# Patient Record
Sex: MALE | Race: WHITE | NOT HISPANIC OR LATINO | Employment: FULL TIME | URBAN - METROPOLITAN AREA
[De-identification: names, ages, dates, MRNs, and addresses within clinical notes are randomized per-mention and may not be internally consistent; named-entity substitution may affect disease eponyms.]

---

## 2020-12-20 ENCOUNTER — NURSE TRIAGE (OUTPATIENT)
Dept: OTHER | Facility: OTHER | Age: 70
End: 2020-12-20

## 2020-12-20 ENCOUNTER — OFFICE VISIT (OUTPATIENT)
Dept: URGENT CARE | Facility: CLINIC | Age: 70
End: 2020-12-20
Payer: COMMERCIAL

## 2020-12-20 VITALS — OXYGEN SATURATION: 95 % | RESPIRATION RATE: 20 BRPM | TEMPERATURE: 97.9 F | HEART RATE: 102 BPM

## 2020-12-20 DIAGNOSIS — Z20.822 EXPOSURE TO COVID-19 VIRUS: ICD-10-CM

## 2020-12-20 DIAGNOSIS — R05.9 COUGH: Primary | ICD-10-CM

## 2020-12-20 PROCEDURE — 99213 OFFICE O/P EST LOW 20 MIN: CPT | Performed by: PHYSICIAN ASSISTANT

## 2020-12-20 PROCEDURE — S9088 SERVICES PROVIDED IN URGENT: HCPCS | Performed by: PHYSICIAN ASSISTANT

## 2020-12-20 PROCEDURE — U0003 INFECTIOUS AGENT DETECTION BY NUCLEIC ACID (DNA OR RNA); SEVERE ACUTE RESPIRATORY SYNDROME CORONAVIRUS 2 (SARS-COV-2) (CORONAVIRUS DISEASE [COVID-19]), AMPLIFIED PROBE TECHNIQUE, MAKING USE OF HIGH THROUGHPUT TECHNOLOGIES AS DESCRIBED BY CMS-2020-01-R: HCPCS | Performed by: PHYSICIAN ASSISTANT

## 2020-12-20 RX ORDER — DUTASTERIDE 0.5 MG/1
0.5 CAPSULE, LIQUID FILLED ORAL
COMMUNITY
Start: 2020-07-31

## 2020-12-20 RX ORDER — GLYBURIDE 5 MG/1
TABLET ORAL
COMMUNITY
Start: 2020-11-30

## 2020-12-20 RX ORDER — DAPAGLIFLOZIN 10 MG/1
10 TABLET, FILM COATED ORAL DAILY
COMMUNITY
Start: 2020-11-06

## 2020-12-20 RX ORDER — SITAGLIPTIN 100 MG/1
100 TABLET, FILM COATED ORAL DAILY
COMMUNITY
Start: 2020-12-14

## 2020-12-20 RX ORDER — LISINOPRIL 5 MG/1
5 TABLET ORAL
COMMUNITY
Start: 2020-12-10

## 2020-12-20 RX ORDER — METOPROLOL SUCCINATE 25 MG/1
25 TABLET, EXTENDED RELEASE ORAL
COMMUNITY
Start: 2020-07-17

## 2020-12-22 ENCOUNTER — TELEPHONE (OUTPATIENT)
Dept: URGENT CARE | Facility: CLINIC | Age: 70
End: 2020-12-22

## 2020-12-22 LAB — SARS-COV-2 RNA SPEC QL NAA+PROBE: DETECTED

## 2023-10-23 ENCOUNTER — APPOINTMENT (EMERGENCY)
Dept: RADIOLOGY | Facility: HOSPITAL | Age: 73
End: 2023-10-23
Payer: COMMERCIAL

## 2023-10-23 ENCOUNTER — HOSPITAL ENCOUNTER (EMERGENCY)
Facility: HOSPITAL | Age: 73
Discharge: HOME | End: 2023-10-23
Attending: EMERGENCY MEDICINE | Admitting: EMERGENCY MEDICINE
Payer: COMMERCIAL

## 2023-10-23 VITALS
HEART RATE: 100 BPM | DIASTOLIC BLOOD PRESSURE: 88 MMHG | RESPIRATION RATE: 16 BRPM | WEIGHT: 220 LBS | SYSTOLIC BLOOD PRESSURE: 141 MMHG | BODY MASS INDEX: 31.5 KG/M2 | TEMPERATURE: 99 F | HEIGHT: 70 IN | OXYGEN SATURATION: 97 %

## 2023-10-23 DIAGNOSIS — M25.562 LEFT KNEE PAIN: Primary | ICD-10-CM

## 2023-10-23 PROCEDURE — 73564 X-RAY EXAM KNEE 4 OR MORE: CPT

## 2023-10-23 PROCEDURE — 99283 EMERGENCY DEPT VISIT LOW MDM: CPT

## 2023-10-23 PROCEDURE — 99284 EMERGENCY DEPT VISIT MOD MDM: CPT | Performed by: PHYSICIAN ASSISTANT

## 2023-10-23 PROCEDURE — 96372 THER/PROPH/DIAG INJ SC/IM: CPT

## 2023-10-23 RX ORDER — KETOROLAC TROMETHAMINE 30 MG/ML
15 INJECTION, SOLUTION INTRAMUSCULAR; INTRAVENOUS ONCE
Status: COMPLETED | OUTPATIENT
Start: 2023-10-23 | End: 2023-10-23

## 2023-10-23 RX ADMIN — KETOROLAC TROMETHAMINE 15 MG: 30 INJECTION, SOLUTION INTRAMUSCULAR; INTRAVENOUS at 15:10

## 2023-10-23 NOTE — ED PROVIDER NOTES
History  Chief Complaint   Patient presents with    Knee Pain     Pt. Voiced he has had left knee pain that is a chronic problem and usually gets injections from bone doctor for the pain     27-year-old male presented to the emergency department for evaluation of acute on chronic left knee pain. Patient reports his left knee chronically gives some issues reports he has had multiple injections in the knee also has had fluid drained off the knee several times. States yesterday while carrying groceries he stepped wrong. States his foot stopped but his knee kept going. Reports since he has had pain in the knee. States he is now having difficulty bearing weight. Denies any redness or swelling. Denies fevers or chills. Prior to Admission Medications   Prescriptions Last Dose Informant Patient Reported? Taking? Farxiga 10 MG TABS   Yes No   Sig: Take 10 mg by mouth daily   Januvia 100 MG tablet   Yes No   Sig: Take 100 mg by mouth daily   dutasteride (AVODART) 0.5 mg capsule   Yes No   Sig: Take 0.5 mg by mouth   glyBURIDE (DIABETA) 5 mg tablet   Yes No   Sig: take 2 tablets by mouth twice a day WITH MORNING AND EVENING MEALS   lisinopril (ZESTRIL) 5 mg tablet   Yes No   Sig: Take 5 mg by mouth   metFORMIN (GLUCOPHAGE) 1000 MG tablet   Yes No   Sig: Take 1,000 mg by mouth   metoprolol succinate (TOPROL-XL) 25 mg 24 hr tablet   Yes No   Sig: Take 25 mg by mouth      Facility-Administered Medications: None       Past Medical History:   Diagnosis Date    Diabetes mellitus ([address])        Past Surgical History:   Procedure Laterality Date    TONSILLECTOMY         History reviewed. No pertinent family history. I have reviewed and agree with the history as documented.     E-Cigarette/Vaping    E-Cigarette Use Never User      E-Cigarette/Vaping Substances     Social History     Tobacco Use    Smoking status: Never    Smokeless tobacco: Never   Vaping Use    Vaping Use: Never used   Substance Use Topics    Alcohol use: Not Currently    Drug use: Never       Review of Systems   Constitutional: Negative. Respiratory: Negative. Cardiovascular: Negative. Gastrointestinal: Negative. Musculoskeletal:  Positive for arthralgias. Negative for joint swelling. Skin: Negative. All other systems reviewed and are negative. Physical Exam  Physical Exam  Vitals and nursing note reviewed. Constitutional:       General: He is not in acute distress. Appearance: Normal appearance. He is not ill-appearing, toxic-appearing or diaphoretic. HENT:      Head: Normocephalic. Eyes:      Conjunctiva/sclera: Conjunctivae normal.   Pulmonary:      Effort: Pulmonary effort is normal.   Musculoskeletal:         General: Tenderness (Medial left knee tenderness) present. No swelling. Normal range of motion. Left knee: Bony tenderness present. No swelling, deformity, erythema or ecchymosis. Tenderness present over the medial joint line. No LCL laxity or MCL laxity. Skin:     General: Skin is warm and dry. Findings: No bruising or erythema. Neurological:      General: No focal deficit present. Mental Status: He is alert.    Psychiatric:         Mood and Affect: Mood normal.         Vital Signs  ED Triage Vitals [10/23/23 1343]   Temperature Pulse Respirations Blood Pressure SpO2   99 °F (37.2 °C) 100 16 141/88 97 %      Temp Source Heart Rate Source Patient Position - Orthostatic VS BP Location FiO2 (%)   Oral Monitor Sitting Left arm --      Pain Score       10 - Worst Possible Pain           Vitals:    10/23/23 1343   BP: 141/88   Pulse: 100   Patient Position - Orthostatic VS: Sitting         Visual Acuity      ED Medications  Medications   ketorolac (TORADOL) injection 15 mg (15 mg Intramuscular Given 10/23/23 1510)       Diagnostic Studies  Results Reviewed       None                   XR knee 4+ vw left injury    (Results Pending)              Procedures  Procedures         ED Course SBIRT 22yo+      Flowsheet Row Most Recent Value   Initial Alcohol Screen: US AUDIT-C     1. How often do you have a drink containing alcohol? 0 Filed at: 10/23/2023 1436   2. How many drinks containing alcohol do you have on a typical day you are drinking? 0 Filed at: 10/23/2023 1436   3b. FEMALE Any Age, or MALE 65+: How often do you have 4 or more drinks on one occassion? 0 Filed at: 10/23/2023 1436   Audit-C Score 0 Filed at: 10/23/2023 1436   YAMILA: How many times in the past year have you. .. Used an illegal drug or used a prescription medication for non-medical reasons? Never Filed at: 10/23/2023 1436                      Medical Decision Making  80-year-old male presents the emergency department for evaluation of acute on chronic left knee pain. Vitals and medical.  Patient risk for the following but not limited to fracture, contusion, sprain/strain with septic joint. There is no redness or warmth, no swelling, low concern for septic joint. No effusion. ED interpretation of x-ray was negative for acute osseous injury. Will treat as knee sprain. Was provided with Ace wrap and pain. We will follow-up with his orthopedic specialist.  We will discussed return precautions and follow-up and patient verbalized understanding. He was clinically and hemodynamically stable for discharge    Amount and/or Complexity of Data Reviewed  Radiology: ordered. Risk  Prescription drug management. Disposition  Final diagnoses:   Left knee pain     Time reflects when diagnosis was documented in both MDM as applicable and the Disposition within this note       Time User Action Codes Description Comment    10/23/2023  2:57 PM Bar Mathis Add [G69.285] Left knee pain           ED Disposition       ED Disposition   Discharge    Condition   Stable    Date/Time   Mon Oct 23, 2023 [address] discharge to home/self care.                    Follow-up Information Follow up With Specialties Details Why [address], [place] Family Medicine   [address]  [address] [place] [address]  [address] Orthopedic Surgery   [address]  [address]  [address] [facility] [place] [address] [phone]              Discharge Medication List as of 10/23/2023  2:58 PM        CONTINUE these medications which have NOT CHANGED    Details   dutasteride (AVODART) 0.5 mg capsule Take 0.5 mg by mouth, Starting Fri 7/31/2020, Historical Med      Farxiga 10 MG TABS Take 10 mg by mouth daily, Starting Fri 11/6/2020, Historical Med      glyBURIDE (DIABETA) 5 mg tablet take 2 tablets by mouth twice a day WITH MORNING AND EVENING MEALS, Historical Med      Januvia 100 MG tablet Take 100 mg by mouth daily, Starting Mon 12/14/2020, Historical Med      lisinopril (ZESTRIL) 5 mg tablet Take 5 mg by mouth, Starting Thu 12/10/2020, Historical Med      metFORMIN (GLUCOPHAGE) 1000 MG tablet Take 1,000 mg by mouth, Starting Fri 7/31/2020, Historical Med      metoprolol succinate (TOPROL-XL) 25 mg 24 hr tablet Take 25 mg by mouth, Starting Fri 7/17/2020, Historical Med                 PDMP Review       None            ED Provider  Electronically Signed by             Racquel Santos PA-C  10/23/23 3281

## 2023-10-23 NOTE — DISCHARGE INSTRUCTIONS
Alternate between Tylenol and ibuprofen. Use Ace wrap. Rest and ice. Please use walking aid and follow-up with orthopedics.   Please return with any new or worsening symptoms

## 2023-10-25 ENCOUNTER — OFFICE VISIT (OUTPATIENT)
Dept: OBGYN CLINIC | Facility: CLINIC | Age: 73
End: 2023-10-25
Payer: COMMERCIAL

## 2023-10-25 VITALS
SYSTOLIC BLOOD PRESSURE: 120 MMHG | DIASTOLIC BLOOD PRESSURE: 80 MMHG | WEIGHT: 226 LBS | TEMPERATURE: 97.8 F | BODY MASS INDEX: 32.35 KG/M2 | HEIGHT: 70 IN | HEART RATE: 106 BPM

## 2023-10-25 DIAGNOSIS — M25.462 EFFUSION OF LEFT KNEE: ICD-10-CM

## 2023-10-25 DIAGNOSIS — M25.562 LEFT KNEE PAIN: ICD-10-CM

## 2023-10-25 DIAGNOSIS — M17.12 PRIMARY OSTEOARTHRITIS OF LEFT KNEE: Primary | ICD-10-CM

## 2023-10-25 PROCEDURE — 20610 DRAIN/INJ JOINT/BURSA W/O US: CPT | Performed by: ORTHOPAEDIC SURGERY

## 2023-10-25 PROCEDURE — 99204 OFFICE O/P NEW MOD 45 MIN: CPT | Performed by: ORTHOPAEDIC SURGERY

## 2023-10-25 RX ORDER — TRIAMCINOLONE ACETONIDE 40 MG/ML
40 INJECTION, SUSPENSION INTRA-ARTICULAR; INTRAMUSCULAR
Status: COMPLETED | OUTPATIENT
Start: 2023-10-25 | End: 2023-10-25

## 2023-10-25 RX ORDER — BUPIVACAINE HYDROCHLORIDE 2.5 MG/ML
4 INJECTION, SOLUTION INFILTRATION; PERINEURAL
Status: COMPLETED | OUTPATIENT
Start: 2023-10-25 | End: 2023-10-25

## 2023-10-25 RX ADMIN — BUPIVACAINE HYDROCHLORIDE 4 ML: 2.5 INJECTION, SOLUTION INFILTRATION; PERINEURAL at 15:30

## 2023-10-25 RX ADMIN — TRIAMCINOLONE ACETONIDE 40 MG: 40 INJECTION, SUSPENSION INTRA-ARTICULAR; INTRAMUSCULAR at 15:30

## 2023-10-25 NOTE — PROGRESS NOTES
ASSESSMENT/PLAN:    Diagnoses and all orders for this visit:    Primary osteoarthritis of left knee    Left knee pain  -     Ambulatory Referral to Orthopedic Surgery    Effusion of left knee        Plan: Treatment was discussed. He elected to proceed with aspiration and injection which was performed and tolerated well. A compressive dressing was applied. I will see him in 2 to 3 weeks for reevaluation. He did not want to attend physical therapy. A note was provided for him to return to work on 10/30/2023. I have encouraged him to contact me if questions or concerns arise. Return for 2 to 3 weeks. _____________________________________________________  CHIEF COMPLAINT:  Chief Complaint   Patient presents with    Left Knee - Pain         SUBJECTIVE:  Servando Harman is a 68y.o. year old male who presents for evaluation of his left knee. He states he has had chronic left knee pain but symptoms were exacerbated on 10/22/2023. He states he was walking forward, his foot slipped and he flexed the knee, feeling immediate onset of some knee pain but denies actually falling. Pain has persisted over the past few days although there is minimal improvement. He notes swelling which was not present prior to the episode. He was seen a couple months ago by another physician and underwent viscosupplement injection which he states provided excellent relief up until this episode. He denies instability. He presents now for orthopedic evaluation and treatment after having been seen in the emergency room at [facility] on 10/23/2023. X-rays were obtained but no specific treatment was rendered. PAST MEDICAL HISTORY:  Past Medical History:   Diagnosis Date    Diabetes mellitus ([address])        PAST SURGICAL HISTORY:  Past Surgical History:   Procedure Laterality Date    TONSILLECTOMY         FAMILY HISTORY:  History reviewed. No pertinent family history.     SOCIAL HISTORY:  Social History     Tobacco Use Smoking status: Never    Smokeless tobacco: Never   Vaping Use    Vaping Use: Never used   Substance Use Topics    Alcohol use: Not Currently    Drug use: Never       MEDICATIONS:    Current Outpatient Medications:     dutasteride (AVODART) 0.5 mg capsule, Take 0.5 mg by mouth, Disp: , Rfl:     Farxiga 10 MG TABS, Take 10 mg by mouth daily, Disp: , Rfl:     glyBURIDE (DIABETA) 5 mg tablet, take 2 tablets by mouth twice a day WITH MORNING AND EVENING MEALS, Disp: , Rfl:     Januvia 100 MG tablet, Take 100 mg by mouth daily, Disp: , Rfl:     lisinopril (ZESTRIL) 5 mg tablet, Take 5 mg by mouth, Disp: , Rfl:     metFORMIN (GLUCOPHAGE) 1000 MG tablet, Take 1,000 mg by mouth, Disp: , Rfl:     metoprolol succinate (TOPROL-XL) 25 mg 24 hr tablet, Take 25 mg by mouth, Disp: , Rfl:     ALLERGIES:  No Known Allergies    Review of systems:   Constitutional: Negative for fatigue, fever or loss of apetite. HENT: Negative. Respiratory: Negative for shortness of breath, dyspnea. Cardiovascular: Negative for chest pain/tightness. Gastrointestinal: Negative for abdominal pain, N/V. Endocrine: Negative for cold/heat intolerance, unexplained weight loss/gain. Genitourinary: Negative for flank pain, dysuria, hematuria. Musculoskeletal: Positive as in HPI  Skin: Negative for rash. Neurological: Negative  Psychiatric/Behavioral: Negative for agitation. _____________________________________________________  PHYSICAL EXAMINATION:    Blood pressure 120/80, pulse (!) 106, temperature 97.8 °F (36.6 °C), temperature source Temporal, height 5' 10" (1.778 m), weight 103 kg (226 lb). General: well developed and well nourished, alert, oriented times 3, and appears comfortable  Psychiatric: Normal  HEENT: Benign  Cardiovascular: Regular    Pulmonary: No wheezing or stridor  Abdomen: Soft, Nontender  Skin: No masses, erythema, lacerations, fluctation, ulcerations  Neurovascular: Motor and sensory exams are grossly intact. Pulses palpable. MUSCULOSKELETAL EXAMINATION:    The left knee exam demonstrates the skin to be warm and dry. There is no significant warmth. There is no redness. He lacks about 10 to 15 degrees of full extension and can flex to 90 degrees complaining of pain with endrange flexion. Ligaments are grossly stable. He denies any tenderness to palpation of the medial, lateral or anterior knee. He has no posterior tenderness. The remainder of the left lower extremity examination is grossly benign. _____________________________________________________  STUDIES REVIEWED:  Nonweightbearing x-rays of his left knee obtained in the emergency room on 10/23/2023 demonstrate narrowing of the medial joint space, subchondral sclerosis and small osteophytes. An effusion is noted. The report was reviewed. The ER note was reviewed. PROCEDURES:  Large joint arthrocentesis: L knee  Universal Protocol:  Consent: Verbal consent obtained.   Consent given by: patient  Patient understanding: patient states understanding of the procedure being performed  Supporting Documentation  Indications: pain and joint swelling   Procedure Details  Location: knee - L knee  Needle size: 18 G  Ultrasound guidance: no  Approach: lateral  Medications administered: 4 mL bupivacaine 0.25 %; 40 mg triamcinolone acetonide 40 mg/mL    Aspirate amount: 25 mL  Aspirate: yellow and blood-tinged            Coleman Morales

## 2023-10-25 NOTE — LETTER
October 25, 2023     Patient: Servando Harman  YOB: 1950  Date of Visit: 10/25/2023      To Whom it May Concern:    Lavern Arceo is under my professional care. Krystal Cool was seen in my office on 10/25/2023. Krystal Cool may return to work on 10/30/2023. He may return to work without restriction . If you have any questions or concerns, please don't hesitate to call. Sincerely,          Marina Fitch        CC: Esthela Najera.  Warren Randolph

## 2023-11-10 ENCOUNTER — OFFICE VISIT (OUTPATIENT)
Dept: OBGYN CLINIC | Facility: CLINIC | Age: 73
End: 2023-11-10
Payer: COMMERCIAL

## 2023-11-10 VITALS
HEIGHT: 70 IN | DIASTOLIC BLOOD PRESSURE: 78 MMHG | TEMPERATURE: 97.7 F | HEART RATE: 103 BPM | SYSTOLIC BLOOD PRESSURE: 126 MMHG | WEIGHT: 228 LBS | BODY MASS INDEX: 32.64 KG/M2

## 2023-11-10 DIAGNOSIS — M17.12 PRIMARY OSTEOARTHRITIS OF LEFT KNEE: Primary | ICD-10-CM

## 2023-11-10 PROCEDURE — 99213 OFFICE O/P EST LOW 20 MIN: CPT | Performed by: ORTHOPAEDIC SURGERY

## 2023-11-10 NOTE — PATIENT INSTRUCTIONS
Patient like to be evaluated in February for possible Visco injection if deemed warranted. Tentatively scheduled for early to mid February ointment time. He is to continue maintaining some activity he may use ice and over-the-counter pain medicine. Attempts weight loss. Patient is going to have aortic valve replacement in the near future.

## 2023-11-10 NOTE — PROGRESS NOTES
ASSESSMENT/PLAN:    Diagnoses and all orders for this visit:    Primary osteoarthritis of left knee    Patient like to be evaluated in February for possible Visco injection if deemed warranted. Tentatively scheduled for early to mid February ointment time. He is to continue maintaining some activity he may use ice and over-the-counter pain medicine. Attempts weight loss. Patient is going to have aortic valve replacement in the near future. Return About 3 months, for Recheck.  _____________________________________________________  CHIEF COMPLAINT:  Chief Complaint   Patient presents with    Follow-up     SUBJECTIVE:  Pola Grimm is a 68y.o. year old male who presents for follow up of his left knee pain and arthritis. He had previous effusion and underwent aspiration and injection with disown 10/25/2023, 2 weeks ago. Reports he is approximately 90% improved. He does note that he had 3 injection hyaluronic acid done in August at another provider. Patient continues to work. He not want to do formal therapy as he has weights at home. He notes that he has pain when laying in bed on the medial side with his leg straight is only main culprit of discomfort at current. He continues to work. PAST MEDICAL HISTORY:  Past Medical History:   Diagnosis Date    Diabetes mellitus ([address])      PAST SURGICAL HISTORY:  Past Surgical History:   Procedure Laterality Date    TONSILLECTOMY       FAMILY HISTORY:  History reviewed. No pertinent family history.     SOCIAL HISTORY:  Social History     Tobacco Use    Smoking status: Never    Smokeless tobacco: Never   Vaping Use    Vaping Use: Never used   Substance Use Topics    Alcohol use: Not Currently    Drug use: Never     MEDICATIONS:    Current Outpatient Medications:     dutasteride (AVODART) 0.5 mg capsule, Take 0.5 mg by mouth, Disp: , Rfl:     Farxiga 10 MG TABS, Take 10 mg by mouth daily, Disp: , Rfl:     glyBURIDE (DIABETA) 5 mg tablet, take 2 tablets by mouth twice a day WITH MORNING AND EVENING MEALS, Disp: , Rfl:     Januvia 100 MG tablet, Take 100 mg by mouth daily, Disp: , Rfl:     lisinopril (ZESTRIL) 5 mg tablet, Take 5 mg by mouth, Disp: , Rfl:     metFORMIN (GLUCOPHAGE) 1000 MG tablet, Take 1,000 mg by mouth, Disp: , Rfl:     metoprolol succinate (TOPROL-XL) 25 mg 24 hr tablet, Take 25 mg by mouth, Disp: , Rfl:     ALLERGIES:  No Known Allergies    REVIEW OF SYSTEMS:  Pertinent items are noted in HPI. A comprehensive review of systems was negative.  _____________________________________________________  PHYSICAL EXAMINATION:  General: well developed and well nourished, alert, oriented times 3, and appears comfortable  Psychiatric: Normal  HEENT:  Normocephalic, atraumatic  Cardiovascular:  Regular  Pulmonary: No wheezing or stridor  Skin: No masses, erthema, lacerations, fluctation, ulcerations  Neurovascular: Grossly intact  MUSCULOSKELETAL EXAMINATION:    Left knee is without gross effusion erythema or warmth. His primary tenderness still continues on the medial femoral condyle. No lateral pain or patellofemoral type pain. He is able to get full extension without pain. There is no pain with varus or valgus manipulation of the knee.   Walks with a nonantalgic gait.  _____________________________________________________  STUDIES REVIEWED:  No Studies to review    PROCEDURES PERFORMED:  None today    Sara Kapadia PA-C

## 2024-01-02 ENCOUNTER — TELEPHONE (OUTPATIENT)
Dept: OBGYN CLINIC | Facility: MEDICAL CENTER | Age: 74
End: 2024-01-02

## 2024-01-02 ENCOUNTER — TELEPHONE (OUTPATIENT)
Age: 74
End: 2024-01-02

## 2024-01-02 NOTE — TELEPHONE ENCOUNTER
Caller: Patient Spouse    Doctor: Javier    Reason for call: Patient spouse calling back to check on force-on request.  Reports patient is in extreme pain and she is anxious to get him scheduled ASAP.    Call back#: [phone]

## 2024-01-02 NOTE — TELEPHONE ENCOUNTER
Hello,  Please advise if the following patient can be forced onto the schedule:    Patient: Wade    :50    MRN: [id number]     Call back #: [phone] please call pts wife    Insurance: [facility]     Reason for appointment: Pt is in severe pain. His left knee is filled with fluid. Is is possible for The patient to be forced on the schedule for 1/3 with Javier. I was able to get him on  but his wife is extremely concerned, the patient screams out when he movies it.  Could this appointment please be as early in the am as possible due to him having a cardio appt in the afternoon. Thank you.    Requested  Dr Herrera /Merry       Thank you.

## 2024-01-03 ENCOUNTER — HOSPITAL ENCOUNTER (OUTPATIENT)
Dept: RADIOLOGY | Facility: CLINIC | Age: 74
Discharge: HOME | End: 2024-01-03
Payer: COMMERCIAL

## 2024-01-03 ENCOUNTER — OFFICE VISIT (OUTPATIENT)
Dept: OBGYN CLINIC | Facility: CLINIC | Age: 74
End: 2024-01-03
Payer: COMMERCIAL

## 2024-01-03 VITALS
SYSTOLIC BLOOD PRESSURE: 130 MMHG | HEART RATE: 94 BPM | WEIGHT: 228.6 LBS | TEMPERATURE: 97.7 F | DIASTOLIC BLOOD PRESSURE: 80 MMHG | HEIGHT: 70 IN | BODY MASS INDEX: 32.73 KG/M2

## 2024-01-03 DIAGNOSIS — G89.29 CHRONIC PAIN OF LEFT KNEE: ICD-10-CM

## 2024-01-03 DIAGNOSIS — M25.462 EFFUSION OF LEFT KNEE: Primary | ICD-10-CM

## 2024-01-03 DIAGNOSIS — M25.462 EFFUSION OF LEFT KNEE: ICD-10-CM

## 2024-01-03 DIAGNOSIS — M17.12 PRIMARY OSTEOARTHRITIS OF LEFT KNEE: ICD-10-CM

## 2024-01-03 DIAGNOSIS — M25.562 CHRONIC PAIN OF LEFT KNEE: ICD-10-CM

## 2024-01-03 PROCEDURE — 20610 DRAIN/INJ JOINT/BURSA W/O US: CPT | Performed by: ORTHOPAEDIC SURGERY

## 2024-01-03 PROCEDURE — 99214 OFFICE O/P EST MOD 30 MIN: CPT | Performed by: ORTHOPAEDIC SURGERY

## 2024-01-03 PROCEDURE — 73564 X-RAY EXAM KNEE 4 OR MORE: CPT

## 2024-01-03 NOTE — PROGRESS NOTES
ASSESSMENT/PLAN:    Diagnoses and all orders for this visit:    Effusion of left knee  -     XR knee 4+ vw left injury; Future    Primary osteoarthritis of left knee  -     XR knee 4+ vw left injury; Future    Chronic pain of left knee        Plan: Treatment was discussed.  He elected to proceed with attempted aspiration.  Unfortunately, no fluid was returned although he does admit that the knee felt better afterwards.  He did not want to pursue corticosteroid injection after a discussion of the risks and the lack of improvement in his symptoms for a reasonable period of time after injection was done just over 2 months ago.  He is scheduled to see me in February at which point in time we will attempt to obtain approval for viscosupplement injection after he has been seen.  He underwent viscosupplementation at a different orthopedic office in August 2023.  He noted significant reduction in symptoms for approximately 2 months.  He did not express any interest in pursuing knee replacement arthroplasty indicating that he had 1 more year until residential.    Return for As scheduled.      _____________________________________________________  CHIEF COMPLAINT:  Chief Complaint   Patient presents with   • Follow-up         SUBJECTIVE:  Wade Rooney is a 73 y.o. year old male who presents for follow up of his left knee.  He was last seen in November and was doing well status post corticosteroid injection which was done in late October.  He did well up until about a week ago when he began developing recurrent swelling and pain in his left knee.  He believes symptoms are worsening over the past week.  He notes difficulty sitting.  He states that ambulation does not seem to worsen his pain.  He denies anything that he can recall that may have triggered the change in symptoms.  He has used ice without benefit.  He presents now for orthopedic evaluation and treatment.      PAST MEDICAL HISTORY:  Past Medical History:    Diagnosis Date   • Diabetes mellitus (HCC)        PAST SURGICAL HISTORY:  Past Surgical History:   Procedure Laterality Date   • TONSILLECTOMY         FAMILY HISTORY:  History reviewed. No pertinent family history.    SOCIAL HISTORY:  Social History     Tobacco Use   • Smoking status: Never   • Smokeless tobacco: Never   Vaping Use   • Vaping status: Never Used   Substance Use Topics   • Alcohol use: Not Currently   • Drug use: Never       MEDICATIONS:    Current Outpatient Medications:   •  dutasteride (AVODART) 0.5 mg capsule, Take 0.5 mg by mouth, Disp: , Rfl:   •  Farxiga 10 MG TABS, Take 10 mg by mouth daily, Disp: , Rfl:   •  glyBURIDE (DIABETA) 5 mg tablet, take 2 tablets by mouth twice a day WITH MORNING AND EVENING MEALS, Disp: , Rfl:   •  Januvia 100 MG tablet, Take 100 mg by mouth daily, Disp: , Rfl:   •  lisinopril (ZESTRIL) 5 mg tablet, Take 5 mg by mouth, Disp: , Rfl:   •  metFORMIN (GLUCOPHAGE) 1000 MG tablet, Take 1,000 mg by mouth, Disp: , Rfl:   •  metoprolol succinate (TOPROL-XL) 25 mg 24 hr tablet, Take 25 mg by mouth, Disp: , Rfl:     ALLERGIES:  No Known Allergies    REVIEW OF SYSTEMS:  Pertinent items are noted in HPI.  A comprehensive review of systems was negative.      _____________________________________________________  PHYSICAL EXAMINATION:  General: well developed and well nourished, alert, and appears comfortable  Psychiatric: Normal  HEENT:  Normocephalic, atraumatic  Cardiovascular:  Regular  Pulmonary: No wheezing or stridor  Skin: No masses, erthema, lacerations, fluctation, ulcerations  Neurovascular: Motor and sensory exams are intact and pulses palpable.    MUSCULOSKELETAL EXAMINATION:    Left knee exam demonstrates a moderate effusion.  There is no erythema or rubor.  No lacerations or abrasions are noted.  He lacks approximately 10 degrees of full extension and has flexion to no more than 90 degrees complaining of pain with endrange flexion and extension.  Mild varus  deformity is noted.  Varus can be corrected toward neutral with valgus stress without complaints.  He has no tenderness anteriorly, medially or laterally.  The remainder of the lower extremity examination bilaterally is benign.    _____________________________________________________  STUDIES REVIEWED:  X-rays of his knee obtained today including weightbearing images demonstrate narrowing of the medial joint space with nearly complete loss of joint space on the flexion image.  There is mild lateral translation of the patella on the sunrise image.  There is no significant evidence of osteophytes or significant subchondral sclerosis appreciated.      PROCEDURES PERFORMED:  Large joint arthrocentesis: L knee  Universal Protocol:  Consent: Verbal consent obtained.  Consent given by: patient  Patient understanding: patient states understanding of the procedure being performed  Supporting Documentation  Indications: pain and joint swelling   Procedure Details  Location: knee - L knee  Needle size: 18 G  Ultrasound guidance: no  Approach: lateral    Aspirate amount: 0 mL              Dwain Herrera

## 2024-02-02 ENCOUNTER — TELEPHONE (OUTPATIENT)
Age: 74
End: 2024-02-02

## 2024-02-02 NOTE — TELEPHONE ENCOUNTER
Caller: patients wife    Doctor: Javier    Reason for call: Received a missed call, no message left or no documentation in chart     Call back#: [phone]

## 2024-02-23 ENCOUNTER — OFFICE VISIT (OUTPATIENT)
Dept: OBGYN CLINIC | Facility: CLINIC | Age: 74
End: 2024-02-23
Payer: COMMERCIAL

## 2024-02-23 VITALS
BODY MASS INDEX: 33.21 KG/M2 | SYSTOLIC BLOOD PRESSURE: 150 MMHG | DIASTOLIC BLOOD PRESSURE: 80 MMHG | HEART RATE: 112 BPM | TEMPERATURE: 97.6 F | WEIGHT: 232 LBS | HEIGHT: 70 IN

## 2024-02-23 DIAGNOSIS — M17.12 PRIMARY OSTEOARTHRITIS OF LEFT KNEE: Primary | ICD-10-CM

## 2024-02-23 DIAGNOSIS — E11.9 TYPE 2 DIABETES MELLITUS WITH HEMOGLOBIN A1C GOAL OF LESS THAN 7.0% (HCC): ICD-10-CM

## 2024-02-23 PROCEDURE — 99213 OFFICE O/P EST LOW 20 MIN: CPT | Performed by: ORTHOPAEDIC SURGERY

## 2024-02-23 NOTE — PATIENT INSTRUCTIONS
Patient would like to proceed with viscosupplement injection again.  He prefers a single injection medication therefore Durolane was ordered.  He would prefer a Wednesday later day appointment due to his work schedule.  Use Tylenol and ice for discomfort until the injection is available.  Weightbearing as tolerated.

## 2024-02-23 NOTE — PROGRESS NOTES
ASSESSMENT/PLAN:    Diagnoses and all orders for this visit:    Primary osteoarthritis of left knee  -     Injection Procedure Prior Authorization; Future    Type 2 diabetes mellitus with hemoglobin A1c goal of less than 7.0% (HCC)    Patient would like to proceed with viscosupplement injection again.  He prefers a single injection medication therefore Durolane was ordered.  He would prefer a Wednesday later day appointment due to his work schedule.  Use Tylenol and ice for discomfort until the injection is available.  Weightbearing as tolerated.    Return When Visco injection approved.  ____________________________________________________  CHIEF COMPLAINT:  Chief Complaint   Patient presents with    Follow-up     SUBJECTIVE:  Wade Rooney is a 73 y.o. year old male who presents for follow up of left knee pain and arthritis.  He notes that his worst his pain rating is an 8/10.  When he is at rest sitting he often has no pain.  He notes his pain is worsened with ambulation or going stairs.  He also reports that he cannot lead with his left leg when doing stairs.      PAST MEDICAL HISTORY:  Past Medical History:   Diagnosis Date    Diabetes mellitus (HCC)      PAST SURGICAL HISTORY:  Past Surgical History:   Procedure Laterality Date    TONSILLECTOMY       FAMILY HISTORY:  History reviewed. No pertinent family history.    SOCIAL HISTORY:  Social History     Tobacco Use    Smoking status: Never    Smokeless tobacco: Never   Vaping Use    Vaping status: Never Used   Substance Use Topics    Alcohol use: Not Currently    Drug use: Never     MEDICATIONS:    Current Outpatient Medications:     dutasteride (AVODART) 0.5 mg capsule, Take 0.5 mg by mouth, Disp: , Rfl:     Farxiga 10 MG TABS, Take 10 mg by mouth daily, Disp: , Rfl:     glyBURIDE (DIABETA) 5 mg tablet, take 2 tablets by mouth twice a day WITH MORNING AND EVENING MEALS, Disp: , Rfl:     Januvia 100 MG tablet, Take 100 mg by mouth daily, Disp: , Rfl:      lisinopril (ZESTRIL) 5 mg tablet, Take 5 mg by mouth, Disp: , Rfl:     metFORMIN (GLUCOPHAGE) 1000 MG tablet, Take 1,000 mg by mouth, Disp: , Rfl:     metoprolol succinate (TOPROL-XL) 25 mg 24 hr tablet, Take 25 mg by mouth, Disp: , Rfl:     ALLERGIES:  No Known Allergies    REVIEW OF SYSTEMS:  Pertinent items are noted in HPI.  A comprehensive review of systems was negative.  _____________________________________________________  PHYSICAL EXAMINATION:  General: well developed and well nourished, alert, and oriented times 3  Psychiatric: Normal  HEENT:  Normocephalic, atraumatic  Cardiovascular:  Regular  Pulmonary: No wheezing or stridor  Skin: No masses, erthema, lacerations, fluctation, ulcerations  Neurovascular: Grossly intact  MUSCULOSKELETAL EXAMINATION:    Left knee trace effusion.  Range of motion 5 to 95 degrees.  No gross ligamentous laxity.  Pain over the medial femoral condyle and medial tibial plateau region.  Slight patellofemoral crepitation.  _____________________________________________________  STUDIES REVIEWED:  None today    PROCEDURES PERFORMED:  None today    Eloy Garcia PA-C

## 2024-02-26 ENCOUNTER — TELEPHONE (OUTPATIENT)
Dept: OBGYN CLINIC | Facility: HOSPITAL | Age: 74
End: 2024-02-26

## 2024-02-26 NOTE — TELEPHONE ENCOUNTER
Caller: Lourdes (Wife)    Doctor: Javier    Reason for call: Wife called concerning visco injections, she was not understanding. Patient was told to call to get an appointment. I explained the process. I advised our team would call them once approved.    She is asking if this can be expedited due to the pain he is in?    Call back#: [phone]

## 2024-02-28 ENCOUNTER — PROCEDURE VISIT (OUTPATIENT)
Dept: OBGYN CLINIC | Facility: CLINIC | Age: 74
End: 2024-02-28
Payer: COMMERCIAL

## 2024-02-28 VITALS
DIASTOLIC BLOOD PRESSURE: 80 MMHG | HEART RATE: 79 BPM | WEIGHT: 232 LBS | TEMPERATURE: 97.7 F | BODY MASS INDEX: 33.21 KG/M2 | SYSTOLIC BLOOD PRESSURE: 132 MMHG | HEIGHT: 70 IN

## 2024-02-28 DIAGNOSIS — M25.562 CHRONIC PAIN OF LEFT KNEE: ICD-10-CM

## 2024-02-28 DIAGNOSIS — M17.12 PRIMARY OSTEOARTHRITIS OF LEFT KNEE: Primary | ICD-10-CM

## 2024-02-28 DIAGNOSIS — G89.29 CHRONIC PAIN OF LEFT KNEE: ICD-10-CM

## 2024-02-28 PROCEDURE — 20610 DRAIN/INJ JOINT/BURSA W/O US: CPT | Performed by: ORTHOPAEDIC SURGERY

## 2024-02-28 NOTE — PROGRESS NOTES
ASSESSMENT/PLAN:    Diagnoses and all orders for this visit:    Primary osteoarthritis of left knee    Chronic pain of left knee    Plan: Under aseptic technique, the left knee was injected with Monovisc.  He tolerated this well.  I will see him back as needed.  I did offer to see him in 4 to 6 weeks for reevaluation but he prefers to return only if the injection does not work or when symptoms were to return.  He does understand that injection cannot be repeated any sooner than 6 months from now.    Return if symptoms worsen or fail to improve.      _____________________________________________________  CHIEF COMPLAINT:  Chief Complaint   Patient presents with    Follow-up         SUBJECTIVE:  Wade Rooney is a 73 y.o. year old male who presents for viscosupplementation.  He continues to complain of left knee pain and wishes to proceed with injection as planned.    PAST MEDICAL HISTORY:  Past Medical History:   Diagnosis Date    Diabetes mellitus (HCC)        PAST SURGICAL HISTORY:  Past Surgical History:   Procedure Laterality Date    TONSILLECTOMY         FAMILY HISTORY:  History reviewed. No pertinent family history.    SOCIAL HISTORY:  Social History     Tobacco Use    Smoking status: Never    Smokeless tobacco: Never   Vaping Use    Vaping status: Never Used   Substance Use Topics    Alcohol use: Not Currently    Drug use: Never       MEDICATIONS:    Current Outpatient Medications:     dutasteride (AVODART) 0.5 mg capsule, Take 0.5 mg by mouth, Disp: , Rfl:     Farxiga 10 MG TABS, Take 10 mg by mouth daily, Disp: , Rfl:     glyBURIDE (DIABETA) 5 mg tablet, take 2 tablets by mouth twice a day WITH MORNING AND EVENING MEALS, Disp: , Rfl:     Januvia 100 MG tablet, Take 100 mg by mouth daily, Disp: , Rfl:     lisinopril (ZESTRIL) 5 mg tablet, Take 5 mg by mouth, Disp: , Rfl:     metFORMIN (GLUCOPHAGE) 1000 MG tablet, Take 1,000 mg by mouth, Disp: , Rfl:     metoprolol succinate (TOPROL-XL) 25 mg 24 hr tablet,  Take 25 mg by mouth, Disp: , Rfl:     ALLERGIES:  No Known Allergies    REVIEW OF SYSTEMS:  Pertinent items are noted in HPI.  A comprehensive review of systems was negative.      _____________________________________________________  PHYSICAL EXAMINATION:  General: alert, oriented times 3 and appears comfortable  HEENT:  Normocephalic, atraumatic  Cardiovascular:  Regular  Pulmonary: No wheezing or stridor  Skin: No lacerations or abrasions.  Skin warm and dry  Neurovascular: Motor and sensory exams are grossly intact, +2 PT pulse.     MUSCULOSKELETAL EXAMINATION:    The left knee exam demonstrates minimal effusion.  He has full extension and flexion to 120 degrees.  Skin is warm and dry.  Ligaments are stable.          PROCEDURES PERFORMED:  Large joint arthrocentesis: L knee  Universal Protocol:  Consent: Verbal consent obtained.  Consent given by: patient  Patient understanding: patient states understanding of the procedure being performed  Supporting Documentation  Indications: pain   Procedure Details  Location: knee - L knee  Needle size: 22 G  Ultrasound guidance: no  Approach: lateral  Medications administered: 88 mg hyaluronan 88 MG/4ML                Dwain Herrera

## 2024-02-28 NOTE — TELEPHONE ENCOUNTER
Caller: Patient's mom     Doctor: Javier     Reason for call: Wife wanted to make sure he was scheduled, I confirmed with wife

## 2024-08-03 ENCOUNTER — NURSE TRIAGE (OUTPATIENT)
Dept: OTHER | Facility: OTHER | Age: 74
End: 2024-08-03

## 2024-08-03 NOTE — TELEPHONE ENCOUNTER
"Reason for Disposition   Knee giving way (or buckling) when walking is a chronic symptom (recurrent or ongoing AND present > 4 weeks)    Answer Assessment - Initial Assessment Questions  1. LOCATION and RADIATION: \"Where is the pain located?\"       Right knee    2. QUALITY: \"What does the pain feel like?\"  (e.g., sharp, dull, aching, burning)      [place] when attempts to stand; can barely walk    3. SEVERITY: \"How bad is the pain?\" \"What does it keep you from doing?\"   (Scale 1-10; or mild, moderate, severe)    -  MILD (1-3): doesn't interfere with normal activities     -  MODERATE (4-7): interferes with normal activities (e.g., work or school) or awakens from sleep, limping     -  SEVERE (8-10): excruciating pain, unable to do any normal activities, unable to walk     7/10    4. ONSET: \"When did the pain start?\" \"Does it come and go, or is it there all the time?\"      Yesterday    5. RECURRENT: \"Have you had this pain before?\" If Yes, ask: \"When, and what happened then?\"      Yes - has had several shots    6. SETTING: \"Has there been any recent work, exercise or other activity that involved that part of the body?\"       Unknown, pain upon standing    7. AGGRAVATING FACTORS: \"What makes the knee pain worse?\" (e.g., walking, climbing stairs, running)      Walking     8. ASSOCIATED SYMPTOMS: \"Is there any swelling or redness of the knee?\"      Denies    9. OTHER SYMPTOMS: \"Do you have any other symptoms?\" (e.g., chest pain, difficulty breathing, fever, calf pain)      Denies    10. PREGNANCY: \"Is there any chance you are pregnant?\" \"When was your last menstrual period?\"        N/A    Protocols used: Knee Pain-ADULT-      Patient is requesting an appointment for Tuesday/Wednesday late in day if possible.  Please follow up with patient's wife on Monday.    Advised to consider urgent care evaluation if unable to wait until Monday.  Wife verbalized understanding.  "

## 2024-08-05 RX ORDER — TESTOSTERONE 30 MG/1.5ML
SOLUTION TOPICAL
COMMUNITY
Start: 2024-06-04

## 2024-08-05 RX ORDER — PEN NEEDLE, DIABETIC 32GX 5/32"
NEEDLE, DISPOSABLE MISCELLANEOUS
COMMUNITY
Start: 2024-05-11

## 2024-08-05 NOTE — TELEPHONE ENCOUNTER
Called and spoke with patient's wife. Patient is scheduled for an appointment on Wednesday 8/7 in office with Dr. Herrera.

## 2024-08-07 ENCOUNTER — OFFICE VISIT (OUTPATIENT)
Dept: OBGYN CLINIC | Facility: CLINIC | Age: 74
End: 2024-08-07
Payer: COMMERCIAL

## 2024-08-07 ENCOUNTER — HOSPITAL ENCOUNTER (OUTPATIENT)
Dept: RADIOLOGY | Facility: CLINIC | Age: 74
Discharge: HOME | End: 2024-08-07
Payer: COMMERCIAL

## 2024-08-07 VITALS
SYSTOLIC BLOOD PRESSURE: 134 MMHG | BODY MASS INDEX: 33.07 KG/M2 | HEART RATE: 96 BPM | WEIGHT: 231 LBS | HEIGHT: 70 IN | OXYGEN SATURATION: 97 % | TEMPERATURE: 97.1 F | DIASTOLIC BLOOD PRESSURE: 78 MMHG

## 2024-08-07 DIAGNOSIS — G89.29 CHRONIC PAIN OF RIGHT KNEE: Primary | ICD-10-CM

## 2024-08-07 DIAGNOSIS — G89.29 CHRONIC PAIN OF RIGHT KNEE: ICD-10-CM

## 2024-08-07 DIAGNOSIS — M25.561 CHRONIC PAIN OF RIGHT KNEE: Primary | ICD-10-CM

## 2024-08-07 DIAGNOSIS — M17.11 PRIMARY OSTEOARTHRITIS OF RIGHT KNEE: ICD-10-CM

## 2024-08-07 DIAGNOSIS — M25.561 CHRONIC PAIN OF RIGHT KNEE: ICD-10-CM

## 2024-08-07 PROCEDURE — 99213 OFFICE O/P EST LOW 20 MIN: CPT | Performed by: ORTHOPAEDIC SURGERY

## 2024-08-07 PROCEDURE — 73564 X-RAY EXAM KNEE 4 OR MORE: CPT

## 2024-08-07 PROCEDURE — 20610 DRAIN/INJ JOINT/BURSA W/O US: CPT | Performed by: PHYSICIAN ASSISTANT

## 2024-08-07 RX ORDER — BUPIVACAINE HYDROCHLORIDE 2.5 MG/ML
4 INJECTION, SOLUTION INFILTRATION; PERINEURAL
Status: COMPLETED | OUTPATIENT
Start: 2024-08-07 | End: 2024-08-07

## 2024-08-07 RX ORDER — TRIAMCINOLONE ACETONIDE 40 MG/ML
40 INJECTION, SUSPENSION INTRA-ARTICULAR; INTRAMUSCULAR
Status: COMPLETED | OUTPATIENT
Start: 2024-08-07 | End: 2024-08-07

## 2024-08-07 RX ADMIN — TRIAMCINOLONE ACETONIDE 40 MG: 40 INJECTION, SUSPENSION INTRA-ARTICULAR; INTRAMUSCULAR at 16:30

## 2024-08-07 RX ADMIN — BUPIVACAINE HYDROCHLORIDE 4 ML: 2.5 INJECTION, SOLUTION INFILTRATION; PERINEURAL at 16:30

## 2024-08-07 NOTE — PROGRESS NOTES
ASSESSMENT/PLAN:    Diagnoses and all orders for this visit:    Chronic pain of right knee  -     XR knee 4+ vw right injury; Future  -     Injection Procedure Prior Authorization; Future    Primary osteoarthritis of right knee    Elected cortisone injection into the right knee due to symptomatic relief of the increasing pain in his head and the fact that he is a .  He did not want any work restrictions.  He would like to proceed with physical injection of the right knee as he had this in the past and this was ordered.  Note is that the Monovisc worked very well for his contralateral left knee in the past.  The right knee 20 to 25 minutes this evening and as needed for pain or swelling.  He may continue to use his soft knee brace.    Return for When Visco injection available.  _____________________________________________________  CHIEF COMPLAINT:  No chief complaint on file.    SUBJECTIVE:  Wade Rooney is a 74 y.o. year old male who presents for [facility] initial evaluation of right knee pain.  He has been previously seen in this practice for left knee pain and received Monovisc injection in February which she is doing very well for him.  Reports that he was never seen for his right knee in this practice, but was seeing Dr. Arroyo the right knee and has previously had both cortisone and Visco supplement injections into it.  He last saw that physician approximately 3 months prior to starting with [facility] orth[facility] in October 2023.  He denies any new injury trauma or fall to the knee.  He reports that he was told he had meniscus tears and some arthritis in that knee and got adequate relief with previous injections.    PAST MEDICAL HISTORY:  Past Medical History:   Diagnosis Date    Diabetes mellitus (HCC)      PAST SURGICAL HISTORY:  Past Surgical History:   Procedure Laterality Date    AORTIC VALVE REPLACEMENT      CARDIAC PACEMAKER PLACEMENT      TONSILLECTOMY       FAMILY HISTORY:  No  family history on file.    SOCIAL HISTORY:  Social History     Tobacco Use    Smoking status: Never    Smokeless tobacco: Never   Vaping Use    Vaping status: Never Used   Substance Use Topics    Alcohol use: Not Currently    Drug use: Never     MEDICATIONS:    Current Outpatient Medications:     BD Pen Needle Leonie 2nd Gen 32G X 4 MM MISC, USE ONE PEN NEEDLE TO INJECT MEDICATION SUBCUTANEOUSLY ONE TO TWO TIMES A DAY, Disp: , Rfl:     dutasteride (AVODART) 0.5 mg capsule, Take 0.5 mg by mouth, Disp: , Rfl:     Farxiga 10 MG TABS, Take 10 mg by mouth daily, Disp: , Rfl:     glyBURIDE (DIABETA) 5 mg tablet, take 2 tablets by mouth twice a day WITH MORNING AND EVENING MEALS, Disp: , Rfl:     Januvia 100 MG tablet, Take 100 mg by mouth daily, Disp: , Rfl:     lisinopril (ZESTRIL) 5 mg tablet, Take 5 mg by mouth, Disp: , Rfl:     metFORMIN (GLUCOPHAGE) 1000 MG tablet, Take 1,000 mg by mouth, Disp: , Rfl:     metoprolol succinate (TOPROL-XL) 25 mg 24 hr tablet, Take 25 mg by mouth, Disp: , Rfl:     Testosterone 30 MG/ACT SOLN, APPLY 1 PUMP (30 MG) BY TRANSDERMAL ROUTE ONCE DAILY IN THE MORNING TO 1 UNDERARM ONLY FOR 30 DAYS, Disp: , Rfl:     ALLERGIES:  No Known Allergies    REVIEW OF SYSTEMS:  Pertinent items are noted in HPI.  A comprehensive review of systems was negative.  _____________________________________________________  PHYSICAL EXAMINATION:  General: well developed and well nourished, alert, and oriented times 3  Psychiatric: Normal  HEENT:  Normocephalic, atraumatic  Cardiovascular:  Regular  Pulmonary: No wheezing or stridor  Skin: No masses, erthema, lacerations, fluctation, ulcerations  Neurovascular: Grossly intact    MUSCULOSKELETAL EXAMINATION:    Right knee presents with positive suprapatellar effusion, no erythema, no warmth, no ecchymosis, no signs of infection.  He has pain over the medial femoral condyle medial joint line which extends in the posterior aspect.  He is able to get full extension with  "flexion limited to approximately 105 degrees.  No gross ligamentous laxity with medial and lateral valgus stress.  Minimal patella facet tenderness.  _____________________________________________________  STUDIES REVIEWED:  I personally viewed x-rays of the right knee taken in the office today which document progressive arthritis primarily of the medial compartment and a spur on the anterior distal femur.    PROCEDURES PERFORMED:  Large joint arthrocentesis: R knee  [place] Protocol:  Procedure performed by:  Risks and benefits: risks, benefits and alternatives were discussed  Consent given by: patient  Time out: Immediately prior to procedure a \"time out\" was called to verify the correct patient, procedure, equipment, support staff and site/side marked as required.  Timeout called at: 8/7/2024 5:21 PM.  Patient understanding: patient states understanding of the procedure being performed  Site marked: the operative site was marked  Patient identity confirmed: verbally with patient  Supporting Documentation  Indications: pain   Procedure Details  Location: knee - R knee  Preparation: Patient was prepped and draped in the usual sterile fashion  Needle size: 22 G  Ultrasound guidance: no  Approach: superior  Medications administered: 4 mL bupivacaine 0.25 %; 40 mg triamcinolone acetonide 40 mg/mL    Patient tolerance: patient tolerated the procedure well with no immediate complications  Dressing:  Sterile dressing applied      Eloy Garcia PA-C   "

## 2024-08-07 NOTE — PATIENT INSTRUCTIONS
Elected cortisone injection into the right knee due to symptomatic relief of the increasing pain in his head and the fact that he is a .  He did not want any work restrictions.  He would like to proceed with physical injection of the right knee as he had this in the past and this was ordered.  Note is that the Monovisc worked very well for his contralateral left knee in the past.  The right knee 20 to 25 minutes this evening and as needed for pain or swelling.  He may continue to use his soft knee brace.

## 2024-08-08 ENCOUNTER — TELEPHONE (OUTPATIENT)
Dept: OBGYN CLINIC | Facility: HOSPITAL | Age: 74
End: 2024-08-08

## 2024-08-08 NOTE — TELEPHONE ENCOUNTER
Caller: Patient's spouse Lourdes    Doctor: Javier    Reason for call: Wade would like you put in an order for a MONOVISC injection for his right knee. Thank you.    Call back#: [phone]

## 2024-08-14 ENCOUNTER — TELEPHONE (OUTPATIENT)
Age: 74
End: 2024-08-14

## 2024-08-14 NOTE — TELEPHONE ENCOUNTER
Caller: Lourdes - wife    Doctor: Javier    Reason for call: Wife called very upset that they have not heard back about patient's injections. I tried to explain that everything on our side was taken care of and we where waiting on his health insurance for approval. She asked if she could reach out to insurance and I said of course.    Call back#: [phone]    CALL BACK  Caller: Lourdes     Called back after speaking with [employer] insurance and they told her the injection has been approved and to call and schedule the patient for soonest appt. I explained that I would forward the auth number to the team and they would reach out as soon as everything is ready- they are trying to get him in tomorrow I told patient I wasn't sure if they could and someone will reach out.     Auth Number for injection from [employer] [employer] - [id number]

## 2024-08-15 ENCOUNTER — PROCEDURE VISIT (OUTPATIENT)
Dept: OBGYN CLINIC | Facility: CLINIC | Age: 74
End: 2024-08-15
Payer: COMMERCIAL

## 2024-08-15 VITALS
WEIGHT: 231 LBS | BODY MASS INDEX: 33.07 KG/M2 | OXYGEN SATURATION: 96 % | HEIGHT: 70 IN | DIASTOLIC BLOOD PRESSURE: 82 MMHG | SYSTOLIC BLOOD PRESSURE: 138 MMHG | TEMPERATURE: 97.3 F | HEART RATE: 92 BPM

## 2024-08-15 DIAGNOSIS — M17.0 PRIMARY OSTEOARTHRITIS OF KNEES, BILATERAL: Primary | ICD-10-CM

## 2024-08-15 PROCEDURE — 20610 DRAIN/INJ JOINT/BURSA W/O US: CPT | Performed by: ORTHOPAEDIC SURGERY

## 2024-08-15 RX ORDER — INSULIN GLARGINE 100 [IU]/ML
INJECTION, SOLUTION SUBCUTANEOUS
COMMUNITY
Start: 2024-08-13

## 2024-08-15 NOTE — TELEPHONE ENCOUNTER
Caller: Jerald    Doctor: Javier    Reason for call: Jerald is calling stating the patient called to expedite shipping of Wade's medication. They wanted to confirm the office received Auth number.     Auth Number for injection from Jerald - [id number]     I confirmed with the agent that the auth number was received and once the medication arrives we will call the to schedule.

## 2024-08-15 NOTE — PROGRESS NOTES
ASSESSMENT/PLAN:    Diagnoses and all orders for this visit:    Primary osteoarthritis of knees, bilateral      Plan: The right knee was injected with Monovisc without difficulty and tolerated well.  Follow-up was discussed and he would prefer to return only if the injection does not provide adequate relief.  He does understand that repeat injection cannot be performed for a minimum of 6 months.  He was encouraged to contact the office if questions or concerns arise.    Return if symptoms worsen or fail to improve.      _____________________________________________________  CHIEF COMPLAINT:  Chief Complaint   Patient presents with    Follow-up         SUBJECTIVE:  Wade Rooney is a 74 y.o. year old male who presents for viscosupplementation.  He underwent corticosteroid injection recently and notes less than 25% reduction in pain.  He wishes to proceed with viscosupplement injection as planned.     PAST MEDICAL HISTORY:  Past Medical History:   Diagnosis Date    Diabetes mellitus (HCC)        PAST SURGICAL HISTORY:  Past Surgical History:   Procedure Laterality Date    AORTIC VALVE REPLACEMENT      CARDIAC PACEMAKER PLACEMENT      TONSILLECTOMY         FAMILY HISTORY:  History reviewed. No pertinent family history.    SOCIAL HISTORY:  Social History     Tobacco Use    Smoking status: Never    Smokeless tobacco: Never   Vaping Use    Vaping status: Never Used   Substance Use Topics    Alcohol use: Not Currently    Drug use: Never       MEDICATIONS:    Current Outpatient Medications:     BD Pen Needle Leonie 2nd Gen 32G X 4 MM MISC, USE ONE PEN NEEDLE TO INJECT MEDICATION SUBCUTANEOUSLY ONE TO TWO TIMES A DAY, Disp: , Rfl:     dutasteride (AVODART) 0.5 mg capsule, Take 0.5 mg by mouth, Disp: , Rfl:     Farxiga 10 MG TABS, Take 10 mg by mouth daily, Disp: , Rfl:     glyBURIDE (DIABETA) 5 mg tablet, take 2 tablets by mouth twice a day WITH MORNING AND EVENING MEALS, Disp: , Rfl:     Januvia 100 MG tablet, Take 100 mg  by mouth daily, Disp: , Rfl:     Lantus SoloStar 100 units/mL SOPN, , Disp: , Rfl:     lisinopril (ZESTRIL) 5 mg tablet, Take 5 mg by mouth, Disp: , Rfl:     metFORMIN (GLUCOPHAGE) 1000 MG tablet, Take 1,000 mg by mouth, Disp: , Rfl:     metoprolol succinate (TOPROL-XL) 25 mg 24 hr tablet, Take 25 mg by mouth, Disp: , Rfl:     Testosterone 30 MG/ACT SOLN, APPLY 1 PUMP (30 MG) BY TRANSDERMAL ROUTE ONCE DAILY IN THE MORNING TO 1 UNDERARM ONLY FOR 30 DAYS, Disp: , Rfl:     ALLERGIES:  No Known Allergies    REVIEW OF SYSTEMS:  Pertinent items are noted in HPI.  A comprehensive review of systems was negative.      _____________________________________________________  PHYSICAL EXAMINATION:  General: alert, oriented times 3 and appears comfortable  HEENT:  Normocephalic, atraumatic  Cardiovascular:  Regular  Pulmonary: No wheezing or stridor  Skin: No lacerations or abrasions.  Skin warm and dry  Neurovascular: Motor and sensory exams are grossly intact, +2 PT pulse.     MUSCULOSKELETAL EXAMINATION:    The right knee exam demonstrates a minimal effusion.  Skin is warm and dry.  He has full extension and can flex the knee to about 90 degrees comfortably.  Ligaments are stable.        PROCEDURES PERFORMED:  Large joint arthrocentesis: R knee  Universal Protocol:  Consent: Verbal consent obtained.  Consent given by: patient  Patient understanding: patient states understanding of the procedure being performed  Supporting Documentation  Indications: pain   Procedure Details  Location: knee - R knee  Needle size: 22 G  Ultrasound guidance: no  Approach: lateral  Medications administered: 88 mg hyaluronan 88 MG/4ML                Dwain Herrera DO